# Patient Record
Sex: FEMALE | Employment: FULL TIME | URBAN - METROPOLITAN AREA
[De-identification: names, ages, dates, MRNs, and addresses within clinical notes are randomized per-mention and may not be internally consistent; named-entity substitution may affect disease eponyms.]

---

## 2024-09-23 ENCOUNTER — LAB (OUTPATIENT)
Dept: LAB | Facility: LAB | Age: 29
End: 2024-09-23
Payer: MEDICAID

## 2024-09-23 ENCOUNTER — APPOINTMENT (OUTPATIENT)
Dept: PRIMARY CARE | Facility: CLINIC | Age: 29
End: 2024-09-23
Payer: MEDICAID

## 2024-09-23 VITALS — WEIGHT: 156 LBS | DIASTOLIC BLOOD PRESSURE: 80 MMHG | SYSTOLIC BLOOD PRESSURE: 100 MMHG

## 2024-09-23 DIAGNOSIS — Z79.899 CONTROLLED SUBSTANCE AGREEMENT SIGNED: ICD-10-CM

## 2024-09-23 DIAGNOSIS — F32.A ANXIETY AND DEPRESSION: ICD-10-CM

## 2024-09-23 DIAGNOSIS — K21.9 GASTROESOPHAGEAL REFLUX DISEASE, UNSPECIFIED WHETHER ESOPHAGITIS PRESENT: ICD-10-CM

## 2024-09-23 DIAGNOSIS — N63.0 MASS OF BREAST, UNSPECIFIED LATERALITY: Primary | ICD-10-CM

## 2024-09-23 DIAGNOSIS — F51.01 PRIMARY INSOMNIA: ICD-10-CM

## 2024-09-23 DIAGNOSIS — F41.9 ANXIETY AND DEPRESSION: ICD-10-CM

## 2024-09-23 LAB
AMPHETAMINES UR QL SCN: NORMAL
BARBITURATES UR QL SCN: NORMAL
BENZODIAZ UR QL SCN: NORMAL
BZE UR QL SCN: NORMAL
CANNABINOIDS UR QL SCN: NORMAL
FENTANYL+NORFENTANYL UR QL SCN: NORMAL
METHADONE UR QL SCN: NORMAL
OPIATES UR QL SCN: NORMAL
OXYCODONE+OXYMORPHONE UR QL SCN: NORMAL
PCP UR QL SCN: NORMAL

## 2024-09-23 PROCEDURE — 99204 OFFICE O/P NEW MOD 45 MIN: CPT | Performed by: STUDENT IN AN ORGANIZED HEALTH CARE EDUCATION/TRAINING PROGRAM

## 2024-09-23 PROCEDURE — 1036F TOBACCO NON-USER: CPT | Performed by: STUDENT IN AN ORGANIZED HEALTH CARE EDUCATION/TRAINING PROGRAM

## 2024-09-23 PROCEDURE — 80307 DRUG TEST PRSMV CHEM ANLYZR: CPT

## 2024-09-23 RX ORDER — BUPROPION HYDROCHLORIDE 300 MG/1
300 TABLET ORAL
COMMUNITY
Start: 2024-03-08 | End: 2024-09-23 | Stop reason: SDUPTHER

## 2024-09-23 RX ORDER — BUSPIRONE HYDROCHLORIDE 5 MG/1
5 TABLET ORAL 2 TIMES DAILY
Qty: 60 TABLET | Refills: 11 | Status: SHIPPED | OUTPATIENT
Start: 2024-09-23 | End: 2025-09-23

## 2024-09-23 RX ORDER — HYDROXYZINE HYDROCHLORIDE 25 MG/1
1 TABLET, FILM COATED ORAL 2 TIMES DAILY PRN
COMMUNITY
End: 2024-09-23 | Stop reason: WASHOUT

## 2024-09-23 RX ORDER — NORTRIPTYLINE HYDROCHLORIDE 25 MG/1
CAPSULE ORAL
COMMUNITY
Start: 2023-01-31 | End: 2024-09-23 | Stop reason: WASHOUT

## 2024-09-23 RX ORDER — BUPROPION HYDROCHLORIDE 300 MG/1
300 TABLET ORAL
Qty: 90 TABLET | Refills: 3 | Status: SHIPPED | OUTPATIENT
Start: 2024-09-23 | End: 2025-09-23

## 2024-09-25 DIAGNOSIS — N63.0 MASS OF BREAST, UNSPECIFIED LATERALITY: Primary | ICD-10-CM

## 2024-10-02 ENCOUNTER — APPOINTMENT (OUTPATIENT)
Dept: RADIOLOGY | Facility: CLINIC | Age: 29
End: 2024-10-02
Payer: MEDICAID

## 2024-10-02 ENCOUNTER — HOSPITAL ENCOUNTER (OUTPATIENT)
Dept: RADIOLOGY | Facility: CLINIC | Age: 29
Discharge: HOME | End: 2024-10-02
Payer: MEDICAID

## 2024-10-02 VITALS — HEIGHT: 69 IN | WEIGHT: 160 LBS | BODY MASS INDEX: 23.7 KG/M2

## 2024-10-02 DIAGNOSIS — N63.0 MASS OF BREAST, UNSPECIFIED LATERALITY: ICD-10-CM

## 2024-10-02 PROCEDURE — 76642 ULTRASOUND BREAST LIMITED: CPT | Performed by: RADIOLOGY

## 2024-10-02 PROCEDURE — 76641 ULTRASOUND BREAST COMPLETE: CPT | Mod: 50

## 2024-10-02 PROCEDURE — 77062 BREAST TOMOSYNTHESIS BI: CPT | Performed by: RADIOLOGY

## 2024-10-02 PROCEDURE — 77062 BREAST TOMOSYNTHESIS BI: CPT

## 2024-10-02 PROCEDURE — 76982 USE 1ST TARGET LESION: CPT | Mod: 50

## 2024-10-02 PROCEDURE — 77066 DX MAMMO INCL CAD BI: CPT | Performed by: RADIOLOGY

## 2024-10-07 DIAGNOSIS — F41.9 ANXIETY AND DEPRESSION: ICD-10-CM

## 2024-10-07 DIAGNOSIS — F32.A ANXIETY AND DEPRESSION: ICD-10-CM

## 2024-10-07 RX ORDER — BUSPIRONE HYDROCHLORIDE 5 MG/1
5 TABLET ORAL 3 TIMES DAILY
Qty: 90 TABLET | Refills: 11 | Status: SHIPPED | OUTPATIENT
Start: 2024-10-07 | End: 2025-10-07

## 2024-10-28 DIAGNOSIS — F51.01 PRIMARY INSOMNIA: Primary | ICD-10-CM

## 2024-10-30 DIAGNOSIS — F51.01 PRIMARY INSOMNIA: ICD-10-CM

## 2024-11-06 DIAGNOSIS — F51.01 PRIMARY INSOMNIA: Primary | ICD-10-CM

## 2024-11-06 RX ORDER — ZALEPLON 5 MG/1
5 CAPSULE ORAL NIGHTLY PRN
Qty: 10 CAPSULE | Refills: 0 | Status: SHIPPED | OUTPATIENT
Start: 2024-11-06 | End: 2024-11-16

## 2024-12-23 ENCOUNTER — APPOINTMENT (OUTPATIENT)
Dept: PRIMARY CARE | Facility: CLINIC | Age: 29
End: 2024-12-23
Payer: MEDICAID

## 2024-12-23 VITALS
DIASTOLIC BLOOD PRESSURE: 70 MMHG | WEIGHT: 160 LBS | SYSTOLIC BLOOD PRESSURE: 116 MMHG | BODY MASS INDEX: 23.7 KG/M2 | HEIGHT: 69 IN

## 2024-12-23 DIAGNOSIS — F32.A ANXIETY AND DEPRESSION: ICD-10-CM

## 2024-12-23 DIAGNOSIS — R42 POSTURAL LIGHTHEADEDNESS: ICD-10-CM

## 2024-12-23 DIAGNOSIS — F41.9 ANXIETY AND DEPRESSION: ICD-10-CM

## 2024-12-23 DIAGNOSIS — F51.01 PRIMARY INSOMNIA: Primary | ICD-10-CM

## 2024-12-23 PROCEDURE — 99213 OFFICE O/P EST LOW 20 MIN: CPT | Performed by: STUDENT IN AN ORGANIZED HEALTH CARE EDUCATION/TRAINING PROGRAM

## 2024-12-23 PROCEDURE — 90656 IIV3 VACC NO PRSV 0.5 ML IM: CPT | Performed by: STUDENT IN AN ORGANIZED HEALTH CARE EDUCATION/TRAINING PROGRAM

## 2024-12-23 PROCEDURE — 3008F BODY MASS INDEX DOCD: CPT | Performed by: STUDENT IN AN ORGANIZED HEALTH CARE EDUCATION/TRAINING PROGRAM

## 2024-12-23 PROCEDURE — 1036F TOBACCO NON-USER: CPT | Performed by: STUDENT IN AN ORGANIZED HEALTH CARE EDUCATION/TRAINING PROGRAM

## 2024-12-23 PROCEDURE — 90471 IMMUNIZATION ADMIN: CPT | Performed by: STUDENT IN AN ORGANIZED HEALTH CARE EDUCATION/TRAINING PROGRAM

## 2024-12-23 RX ORDER — BUSPIRONE HYDROCHLORIDE 7.5 MG/1
7.5 TABLET ORAL 3 TIMES DAILY
Qty: 90 TABLET | Refills: 11 | Status: SHIPPED | OUTPATIENT
Start: 2024-12-23 | End: 2025-12-23

## 2024-12-23 RX ORDER — TEMAZEPAM 7.5 MG/1
7.5 CAPSULE ORAL NIGHTLY PRN
Qty: 10 CAPSULE | Refills: 0 | Status: SHIPPED | OUTPATIENT
Start: 2024-12-23 | End: 2025-01-02

## 2024-12-23 NOTE — PROGRESS NOTES
Subjective   Patient ID: Jono Flowers is a 29 y.o. female who presents for Follow-up.  HPI  Jnoo is here for follow up visit.    Anxiety is improved with buspar, would like to trial increasing dose to 7.5mg TID. Daridorexant was denied by insurance, required to try two other agents first. She did trial of 10 days of Zaleplon without any significant benefit in sleep. Would like to trial other preferred medication for sleep.     Additionally reports longstanding hx of positional lightheadedness. Will sit on the edge of the bed for ~10 minutes each morning prior to standing up. Eats meals throughout the day, stays well hydrated. Very mindful of positional changes. Has been suspicious of POTS diagnosis, but has never been formally evaluated.    Review of Systems  12-point ROS was reviewed and is negative, unless otherwise noted in HPI    Objective   Vitals:    12/23/24 1253   BP: 116/70      Physical Exam  GEN: alert, conversant, NAD  HEENT: PERRL, EOMI, MMM, Tms pearly gray bilaterally  NECK: supple, no LAD appreciated  CHEST: CTAB  CV: S1, S2, RRR, no murmurs appreciated  ABD: soft, NT, ND  EXT: no significant LE edema  SKIN: warm, dry    Assessment/Plan   #Depression/anxiety  #insomnia  For insomnia: Has trialed melatonin without relief, has trialed nortriptyline without relief, has trialed diphenhydramine/ hydroxyzine without relief. Trialed Zaleplon x10 dayswithout benefit.   - Established with therapist/counselor  - increase buspar to 7.5mg BID  - Continue wellbutrin 300mg daily  - Trial Restoril 7.5mg nightly x10 day  - UDS/CSA (9/23/24)    #GERD  - avoid trigger foods  - continue pepcid 10mg nightly    #positional lightheadedness  - referral to Cardiology for further workup in the possible POTS    Health Maintenance:  Vaccines: COVID (will update), Flu (update today), TDAP (2022)  Screening: Discuss paptest at next visit.  Labs: Reviewed recent, none needed today     RTC in 3 months, or sooner DOROTA PATINO  Kyle, DO

## 2024-12-23 NOTE — PROGRESS NOTES
"Subjective   Patient ID: Jono Flowers is a 29 y.o. female who presents for Follow-up.    HPI     Review of Systems    Objective   Ht 1.753 m (5' 9\")   BMI 23.63 kg/m²     Physical Exam    Assessment/Plan   {Assess/PlanSmartLinks:05475}       "

## 2025-01-08 PROBLEM — R22.1 NECK MASS: Status: ACTIVE | Noted: 2020-11-16

## 2025-01-08 PROBLEM — F41.8 MIXED ANXIETY DEPRESSIVE DISORDER: Status: ACTIVE | Noted: 2019-06-04

## 2025-01-08 PROBLEM — E04.1 THYROID NODULE: Status: ACTIVE | Noted: 2021-01-04

## 2025-01-08 PROBLEM — F48.9 MENTAL HEALTH PROBLEM: Status: ACTIVE | Noted: 2023-04-05

## 2025-01-08 PROBLEM — N63.0 PALPABLE MASS OF BREAST: Status: ACTIVE | Noted: 2023-03-22

## 2025-01-08 PROBLEM — F41.9 ANXIETY: Status: ACTIVE | Noted: 2023-01-31

## 2025-01-27 ENCOUNTER — OFFICE VISIT (OUTPATIENT)
Dept: CARDIOLOGY | Facility: CLINIC | Age: 30
End: 2025-01-27
Payer: COMMERCIAL

## 2025-01-27 VITALS
HEART RATE: 88 BPM | OXYGEN SATURATION: 97 % | HEIGHT: 69 IN | BODY MASS INDEX: 24.35 KG/M2 | DIASTOLIC BLOOD PRESSURE: 88 MMHG | WEIGHT: 164.4 LBS | SYSTOLIC BLOOD PRESSURE: 120 MMHG

## 2025-01-27 DIAGNOSIS — E04.1 THYROID NODULE: Primary | ICD-10-CM

## 2025-01-27 DIAGNOSIS — R55 NEAR SYNCOPE: ICD-10-CM

## 2025-01-27 DIAGNOSIS — R42 POSTURAL LIGHTHEADEDNESS: ICD-10-CM

## 2025-01-27 DIAGNOSIS — R42 DIZZINESS: ICD-10-CM

## 2025-01-27 PROBLEM — F48.9 MENTAL HEALTH PROBLEM: Status: RESOLVED | Noted: 2023-04-05 | Resolved: 2025-01-27

## 2025-01-27 PROBLEM — F41.9 ANXIETY: Status: RESOLVED | Noted: 2023-01-31 | Resolved: 2025-01-27

## 2025-01-27 PROCEDURE — 99204 OFFICE O/P NEW MOD 45 MIN: CPT | Performed by: STUDENT IN AN ORGANIZED HEALTH CARE EDUCATION/TRAINING PROGRAM

## 2025-01-27 PROCEDURE — 93010 ELECTROCARDIOGRAM REPORT: CPT | Performed by: STUDENT IN AN ORGANIZED HEALTH CARE EDUCATION/TRAINING PROGRAM

## 2025-01-27 PROCEDURE — 93005 ELECTROCARDIOGRAM TRACING: CPT | Performed by: STUDENT IN AN ORGANIZED HEALTH CARE EDUCATION/TRAINING PROGRAM

## 2025-01-27 PROCEDURE — 99214 OFFICE O/P EST MOD 30 MIN: CPT | Mod: 25 | Performed by: STUDENT IN AN ORGANIZED HEALTH CARE EDUCATION/TRAINING PROGRAM

## 2025-01-27 PROCEDURE — 3008F BODY MASS INDEX DOCD: CPT | Performed by: STUDENT IN AN ORGANIZED HEALTH CARE EDUCATION/TRAINING PROGRAM

## 2025-01-27 PROCEDURE — 1036F TOBACCO NON-USER: CPT | Performed by: STUDENT IN AN ORGANIZED HEALTH CARE EDUCATION/TRAINING PROGRAM

## 2025-01-27 NOTE — PROGRESS NOTES
Referred by Dr. Wright for Dizziness (Evaluate for POTS)     History Of Present Illness:    Jono Flowers is a 29 y.o. female presents to clinic for evaluation.  Patient has had a chronic history of of which she called orthostatic like symptoms most pronounced in the morning.  When she wakes up she has to rest for a couple minutes to sit up with the legs crossed for couple minutes but her feet down for couple minutes before she attempts to stand.  Sometimes she is not able to do so due to exacerbated symptoms of dizziness lightheadedness sometimes nausea.  These symptoms are vague sometimes provoked after standing for extended period of time.  No true correlation with hot showers.  No rapid heartbeats or chest pain.  No syncope but near syncope is noted.  She does get nauseous with these episodes.  She drinks lots of water and to the point where she urinates 4 times a day usually the urine is clear.  Denies heavy ethanol or heavy tobacco consumption.  She drinks 2-4 drinks a week and usually there is no correlation with symptoms.  No family history of cell or cardiovascular BX.  She has no prior history of heart disease.    Past Medical History:  She has a past medical history of Anxiety and Depression.    Past Surgical History:  She has no past surgical history on file.      Social History:  She reports that she has never smoked. She has never used smokeless tobacco. She reports current alcohol use. She reports that she does not currently use drugs.    Family History:  Family History   Problem Relation Name Age of Onset    Breast cancer Paternal Grandmother      Ovarian cancer Paternal Grandmother      Breast cancer Other          Allergies:  Patient has no known allergies.    Outpatient Medications:  Current Outpatient Medications   Medication Instructions    buPROPion XL (WELLBUTRIN XL) 300 mg, oral, Daily RT    busPIRone (BUSPAR) 7.5 mg, oral, 3 times daily    daridorexant 25 mg, oral, Nightly    temazepam  "(RESTORIL) 7.5 mg, oral, Nightly PRN        Last Recorded Vitals:  Vitals:    01/27/25 1358   BP: 120/88   BP Location: Left arm   Patient Position: Sitting   BP Cuff Size: Adult   Pulse: 88   SpO2: 97%   Weight: 74.6 kg (164 lb 6.4 oz)   Height: 1.753 m (5' 9\")       Physical Exam:  General: No acute distress,  A&O x3  Skin: Warm and dry  Neck: JVD is not elevated  ENT: Moist mucous membranes no lesions appreciated  Pulmonary: CTAB  Cards: Regular rate rhythm, no murmurs gallops or rubs appreciated normal S1-S2  Abdomen: Soft nontender nondistended  Extremities: No edema or cyanosis  Psych: Appropriate mood and affect          Last Labs:  CBC -  No results found for: \"WBC\", \"HGB\", \"HCT\", \"MCV\", \"PLT\"    CMP -  No results found for: \"CALCIUM\", \"PHOS\", \"PROT\", \"ALBUMIN\", \"AST\", \"ALT\", \"ALKPHOS\", \"BILITOT\"    LIPID PANEL -   No results found for: \"CHOL\", \"TRIG\", \"HDL\", \"CHHDL\", \"LDLF\", \"VLDL\", \"NHDL\"    RENAL FUNCTION PANEL -   No results found for: \"GLUCOSE\", \"NA\", \"K\", \"CL\", \"CO2\", \"ANIONGAP\", \"BUN\", \"CREATININE\", \"GFRMALE\", \"CALCIUM\", \"PHOS\", \"ALBUMIN\"     No results found for: \"BNP\", \"HGBA1C\"    Last Cardiology Tests:  ECG:  ECG 12 lead (Clinic Performed) 01/27/2025 (Preliminary)    Assessment/Plan       1.  Orthostatic like symptoms: Near syncope with associated nausea provoked mostly in the morning occasion with exercise or with standing for extended periods of time.  No additional identifiable triggers.  Baseline ECG of normal sinus rhythm normal axis appropriate rate progression.  Orthostatics negative today in office.  -Additional workup as below  -Echocardiogram  -Exercise stress testing  -Autonomic testing  -Thyroid function test  Return to clinic once testing is completed    (This note was generated with voice recognition software and may contain errors including spelling, grammar, syntax and missed recognition of what was dictated, of which may not have been fully corrected)       Jose Daniel Pro MD " PhD

## 2025-01-28 LAB — TSH SERPL-ACNC: 1.04 MIU/L

## 2025-01-31 LAB
ATRIAL RATE: 84 BPM
P AXIS: 57 DEGREES
P OFFSET: 208 MS
P ONSET: 155 MS
PR INTERVAL: 128 MS
Q ONSET: 219 MS
QRS COUNT: 14 BEATS
QRS DURATION: 100 MS
QT INTERVAL: 390 MS
QTC CALCULATION(BAZETT): 460 MS
QTC FREDERICIA: 436 MS
R AXIS: 59 DEGREES
T AXIS: 46 DEGREES
T OFFSET: 414 MS
VENTRICULAR RATE: 84 BPM

## 2025-02-24 ENCOUNTER — APPOINTMENT (OUTPATIENT)
Dept: CARDIOLOGY | Facility: CLINIC | Age: 30
End: 2025-02-24
Payer: COMMERCIAL

## 2025-03-24 ENCOUNTER — APPOINTMENT (OUTPATIENT)
Dept: PRIMARY CARE | Facility: CLINIC | Age: 30
End: 2025-03-24
Payer: MEDICAID

## 2025-03-24 VITALS — WEIGHT: 158 LBS | SYSTOLIC BLOOD PRESSURE: 110 MMHG | BODY MASS INDEX: 23.33 KG/M2 | DIASTOLIC BLOOD PRESSURE: 92 MMHG

## 2025-03-24 DIAGNOSIS — F32.A ANXIETY AND DEPRESSION: ICD-10-CM

## 2025-03-24 DIAGNOSIS — F41.9 ANXIETY AND DEPRESSION: ICD-10-CM

## 2025-03-24 DIAGNOSIS — K21.9 GASTROESOPHAGEAL REFLUX DISEASE, UNSPECIFIED WHETHER ESOPHAGITIS PRESENT: ICD-10-CM

## 2025-03-24 DIAGNOSIS — Z01.84 IMMUNITY STATUS TESTING: Primary | ICD-10-CM

## 2025-03-24 PROCEDURE — 1036F TOBACCO NON-USER: CPT | Performed by: STUDENT IN AN ORGANIZED HEALTH CARE EDUCATION/TRAINING PROGRAM

## 2025-03-24 PROCEDURE — 99213 OFFICE O/P EST LOW 20 MIN: CPT | Performed by: STUDENT IN AN ORGANIZED HEALTH CARE EDUCATION/TRAINING PROGRAM

## 2025-03-24 RX ORDER — BUSPIRONE HYDROCHLORIDE 10 MG/1
10 TABLET ORAL 3 TIMES DAILY
Qty: 270 TABLET | Refills: 3 | Status: SHIPPED | OUTPATIENT
Start: 2025-03-24 | End: 2026-03-24

## 2025-03-24 NOTE — PROGRESS NOTES
Subjective   Patient ID: Jono Flowers is a 29 y.o. female who presents for Follow-up and discuss meds.  HPI  Jono is here for follow up visit.    Feeling improved overall, would like to increase buspar. No adverse side effects, some improvement in symptoms anxiety remains higher. Interested in checking measles immunity. New insurance, unclear if daridorexant is covered, will check and contact if able to try daridorexant again.     Followed up with Cardiology, holding off of further testing for POTS at this time.    Review of Systems  12-point ROS was reviewed and is negative, unless otherwise noted in HPI    Objective   Vitals:    03/24/25 1216   BP: (!) 110/92      Physical Exam  GEN: alert, conversant, NAD  HEENT: PERRL, EOMI, MMM, Tms pearly gray bilaterally  NECK: supple, no LAD appreciated  CHEST: CTAB  CV: S1, S2, RRR, no murmurs appreciated  ABD: soft, NT, ND  EXT: no significant LE edema  SKIN: warm, dry    Assessment/Plan   #Depression/anxiety  #insomnia  For insomnia: Has trialed melatonin without relief, has trialed nortriptyline without relief, has trialed diphenhydramine/ hydroxyzine without relief. Trialed Zaleplon x10 dayswithout benefit.   - Established with therapist/counselor  - increase buspar to 10mg BID  - Continue wellbutrin 300mg daily  - Trial Restoril 7.5mg nightly x10 day, she has not picked this up. Recently changed insurance and will verify what needs to be tried next.  - UDS/CSA (9/23/24)    #GERD  - avoid trigger foods  - continue pepcid 10mg nightly    #positional lightheadedness  - Seen by Cardiology, recommended for ECHO, stress testing, autonomic testing    Health Maintenance:  Vaccines: COVID (will update), Flu (update today), TDAP (2022)  Screening: Discuss paptest at next visit.  Labs: Reviewed recent, none needed today     RTC in 6 months, or sooner PRN    Ankur Wright, DO

## 2025-09-22 ENCOUNTER — APPOINTMENT (OUTPATIENT)
Dept: PRIMARY CARE | Facility: CLINIC | Age: 30
End: 2025-09-22
Payer: COMMERCIAL